# Patient Record
Sex: FEMALE | Race: WHITE
[De-identification: names, ages, dates, MRNs, and addresses within clinical notes are randomized per-mention and may not be internally consistent; named-entity substitution may affect disease eponyms.]

---

## 2017-06-09 NOTE — UC
Complaint Female HPI





- HPI Summary


HPI Summary: 





27 y/o female  presents to the urgent care c/o of vaginal discharge with an 

odor and a burning sensation when she urinates since this morning.  Pt is 

concerned of being pregnant.  Pt states frequency on urination and denies fever

, N/V/D, headache or SOB, pelvic pain.  LMP 2017 with regular menstrual 

cycles.  She has Hx of abnormal PAP and is screened every 6 months. Next appt 

with GYN is in 1 month.





- History Of Current Complaint


Chief Complaint: UCGU


Stated Complaint: Vaginal COMPLAINT


Time Seen by Provider: 17 09:27


Hx Obtained From: Patient


Hx Last Menstrual Period: 17 (but short)


Pregnant?: No


Onset/Duration: Sudden Onset, Lasting Hours, Still Present


Timing: Constant


Severity Initially: Mild


Severity Currently: Mild


Pain Intensity: 0


Pain Scale Used: 0-10 Numeric


Character: Burning


Aggravating Factor(s): Urination


Associated Signs And Symptoms: Positive: Vaginal Discharge, Genital Swelling.  

Negative: Fever, Back Pain, Nausea, Vomiting(# Of Episodes =), Genital Blisters





- Risk Factors


Ectopic Pregnancy Risk Factor: Negative


Ovarian Torsion Risk Factor: Negative





- Allergies/Home Medications


Allergies/Adverse Reactions: 


 Allergies











Allergy/AdvReac Type Severity Reaction Status Date / Time


 


Risperidone [From Risperdal] Allergy Severe FACIAL PAIN Verified 17 08:09











Home Medications: 


 Home Medications





Citalopram TAB* [Celexa TAB*] 10 mg PO DAILY 17 [History Confirmed ]


QUEtiapine TAB* [Seroquel TAB*] 25 mg PO BEDTIME 17 [History Confirmed ]











PMH/Surg Hx/FS Hx/Imm Hx


Previously Healthy: Yes


Psychological History: Bipolar Disorder





- Surgical History


Surgical History: None





- Family History


Known Family History: Positive: Diabetes





- Social History


Occupation: Employed Full-time


Lives: With Family


Alcohol Use: None


Substance Use Type: None


Smoking Status (MU): Never Smoked Tobacco


Household Exposure Type: Cigarettes





- Immunization History


Most Recent Influenza Vaccination: no





Review of Systems


Constitutional: Negative


Skin: Negative


Eyes: Negative


ENT: Negative


Respiratory: Negative


Cardiovascular: Negative


Gastrointestinal: Negative


Genitourinary: Frequency - on urination with burning sensation


Neurovascular: Negative


Musculoskeletal: Negative


Neurological: Negative


Psychological: Negative


All Other Systems Reviewed And Are Negative: Yes





Physical Exam


Triage Information Reviewed: Yes


Vital Signs: 


 Initial Vital Signs











Temp  98.9 F   17 08:05


 


Pulse  84   17 08:05


 


Resp  16   17 08:05


 


BP  110/62   17 08:05


 


Pulse Ox  98   17 08:05














- Additional Comments


PHYSICAL EXAMINATION:


Vital signs reviewed


General: Normotensive, in no acute distress. 


Head: Normocephalic, no lesions. 


Eyes: PERRLA, EOM's full, conjunctivae clear, 


Ears: EAC's clear, TM's normal. Nose: Mucosa normal, no obstruction. 


Throat: Clear, no exudates, no lesions. 


Neck: Supple, no masses, no thyromegaly, no bruits. 


Chest: Lungs clear, no rales, no rhonchi, no wheezes. 


Heart: RR, no murmurs, no rubs, no gallops. 


Abdomen: Soft, no tenderness, no masses, BS normal. 


: Normal, no lesions, no discharge, no hernias noted. 


Pelvic: I was assisted by the Nurse Annimiek.


External genitalia has mild erythema in the labia majora. There is no lesions 

there is no masses noted. Speculum exam: The vaginal walls are within normal 

limits w/ white vaginal discharge with a fishy odor, no  lesions or rashes 

noted. The cervix is closed with erythema around the os. There is no CMT's, and 

no adnexal masses. Sample sent to lab for  G/C and affirm w/ trichomonas.


Rectal: No lesions, no hemorrhoids, 


Back: Normal curvature, no tenderness. 


Extremities: FROM, no deformities, no edema, no erythema. 


Neuro: Physiological, no localizing findings. 


Skin: Normal, no rashes, no lesions noted. 














 Complaint Female Dx





- Course


Course Of Treatment: Vaginal discharge with burning urination: Hx obtained. UA 

and UA pregnancy test ordered: Results UA: negative, UA pregnancy: negative. 

Abnormal Pelvic PE findings:Pelvic: I was assisted by the Nurse Annimiek.  

External genitalia has mild erythema in the labia majora. There is no lesions 

there is no masses noted. Speculum exam: The vaginal walls are within normal 

limits w/ white vaginal discharge with a fishy odor, no  lesions or rashes 

noted. The cervix is closed with erythema around the os. There is no CMT's, and 

no adnexal masses. Sample sent to lab for  G/C and affirm w/ trichomonas. Pt Rx 

Metronidazole 0.75% vaginal appli x 5 day. And  advised if anything else appear 

abnormal in specimen sent to lab will receive a call back from us.  Pt educated 

in BV  and strongly advised to f/u with GYN for further treatment on abnomal 

PAP.  Pt understood and agreed.





- Differential Dx/Diagnosis


Differential Diagnosis/HQI/PQRI: Cervicitis, Pelvic Inflammatory Disease, 

Pregnancy, Sexually Transmitted Disease, Urinary Tract Infection


Provider Diagnoses: Bacterial Vaginosis,





Discharge





- Discharge Plan


Condition: Stable


Disposition: HOME


Prescriptions: 


metroNIDAZOLE VAGINAL 0.75%* 1 applic VAGINAL BEDTIME #1 tube


Patient Education Materials:  Bacterial Vaginosis (ED)


Forms:  *Work Release


Referrals: 


ROMAIN Carr [Primary Care Provider] - 


Additional Instructions: 


Please apply vaginal antibiotic as instructed.  If any other  abnormal results 

are obtained from the specimens collected, you will receive a call to return to 

the urgent care for further treatment. Please keep your appt with the GYN to f/

u for your abnormal PAP.

## 2017-06-11 NOTE — UC
Progress





- Progress Note


Progress Note: 





Pt had burning on urination.  Was started on cephalexin. Urine cult is neg.  

Should stop cephalexin.  If still with sxs, needs definite follow up with PCP 

or urgent care.

## 2017-10-02 NOTE — UC
Complaint Female HPI





- HPI Summary


HPI Summary: 





27 YEAR OLD FEMALE PRESENTS WITH COMPLAINS BILATERAL LEG PAIN AND CONCERNS OF 

BEING PREGNANT. 





- History Of Current Complaint


Stated Complaint: PERSONAL/RIGHT LEG PAIN


Time Seen by Provider: 10/02/17 13:37


Hx Obtained From: Patient


Hx Last Menstrual Period: 5/14/17 (but short)


Onset/Duration: Sudden Onset


Timing: Constant


Severity Initially: Moderate


Severity Currently: Moderate


Pain Scale Used: 0-10 Numeric - 5


Character: Sharp


Aggravating Factor(s): Movement


Alleviating Factor(s): Position





- Allergies/Home Medications


Allergies/Adverse Reactions: 


 Allergies











Allergy/AdvReac Type Severity Reaction Status Date / Time


 


Risperidone [From Risperdal] Allergy Severe FACIAL PAIN Verified 10/02/17 14:06














PMH/Surg Hx/FS Hx/Imm Hx


Previously Healthy: Yes





- Surgical History


Surgical History: None





- Family History


Known Family History: Positive: Unknown, Diabetes





- Social History


Alcohol Use: None


Substance Use Type: None


Smoking Status (MU): Never Smoked Tobacco


Household Exposure Type: Cigarettes





- Immunization History


Most Recent Influenza Vaccination: no





Review of Systems


Constitutional: Negative


Skin: Negative


Eyes: Negative


ENT: Negative


Respiratory: Negative


Cardiovascular: Negative


Gastrointestinal: Negative


Genitourinary: Negative


Motor: Negative


Neurovascular: Negative


Musculoskeletal: Myalgia, Other: - BILATERAL LEG CRAMPS


Neurological: Negative


Psychological: Negative


All Other Systems Reviewed And Are Negative: Yes





Physical Exam


Triage Information Reviewed: Yes


Eye Exam: Normal


ENT Exam: Normal


Dental Exam: Normal


Neck exam: Normal


Neck: Positive: 1


Respiratory Exam: Normal


Cardiovascular Exam: Normal


Abdominal Exam: Normal


Musculoskeletal Exam: Normal


Neurological Exam: Normal


Psychological Exam: Normal


Skin Exam: Normal





 Complaint Female Dx





- Differential Dx/Diagnosis


Provider Diagnoses: BILATERAL LEG CRAMPS





Discharge





- Discharge Plan


Condition: Stable


Disposition: HOME


Prescriptions: 


Nitrofurantoin Monohyd Macro [Macrobid] 100 mg PO BID #14 cap


Patient Education Materials:  Urinary Tract Infection in Women (ED), Leg Pain (

ED)


Forms:  *Work Release


Referrals: 


ROMAIN Carr [Primary Care Provider] -

## 2017-12-19 NOTE — UC
Abdominal Pain Female HPI





- HPI Summary


HPI Summary: 





27 year old female presents with severe rlq pain. 





- History of Current Complaint


Stated Complaint: RLQ PAIN


Time Seen by Provider: 12/19/17 20:07


Hx Obtained From: Patient


Hx Last Menstrual Period: 5/14/17 (but short)


Onset/Duration: Sudden Onset


Severity Initially: Moderate


Severity Currently: Moderate


Pain Scale Used: 0-10 Numeric - 8


Location: Discrete At: RLQ


Radiates: No


Allergies/Adverse Reactions: 


 Allergies











Allergy/AdvReac Type Severity Reaction Status Date / Time


 


Risperidone [From Risperdal] Allergy Severe FACIAL PAIN Verified 10/02/17 14:06














PMH/Surg Hx/FS Hx/Imm Hx


Previously Healthy: Yes





- Surgical History


Surgical History: None





- Family History


Known Family History: Positive: Unknown, Diabetes





- Social History


Alcohol Use: None


Substance Use Type: None


Smoking Status (MU): Never Smoked Tobacco


Household Exposure Type: Cigarettes





- Immunization History


Most Recent Influenza Vaccination: no





Review of Systems


Constitutional: Negative


Skin: Negative


Eyes: Negative


ENT: Negative


Respiratory: Negative


Cardiovascular: Negative


Gastrointestinal: Abdominal Pain - rlq


Genitourinary: Negative


Motor: Negative


Neurovascular: Negative


Musculoskeletal: Negative


Neurological: Negative


Psychological: Negative


All Other Systems Reviewed And Are Negative: Yes





Physical Exam


Triage Information Reviewed: Yes


Vital Signs Reviewed: Yes


Eye Exam: Normal


ENT Exam: Normal


Dental Exam: Normal


Neck exam: Normal


Neck: Positive: 1


Respiratory Exam: Normal


Cardiovascular Exam: Normal


Abdominal Exam: Normal


Abdomen Description: Positive: Other: - rlq pain


Musculoskeletal Exam: Normal


Neurological Exam: Normal


Psychological Exam: Normal


Skin Exam: Normal





Abd Pain Female Course/Dx





- Differential Dx/Diagnosis


Provider Diagnoses: rlq pain





Discharge





- Discharge Plan


Condition: Stable


Disposition: OTHER


Discharge Disposition Comment: patient suggested to go to the er


Patient Education Materials:  Acute Abdominal Pain (ED)


Referrals: 


ROMAIN Carr [[employer], [employer], OTHER] - 


Additional Instructions: 


patient referred to go to the er

## 2018-10-04 ENCOUNTER — HOSPITAL ENCOUNTER (EMERGENCY)
Dept: HOSPITAL 25 - UCCORT | Age: 28
Discharge: HOME | End: 2018-10-04
Payer: COMMERCIAL

## 2018-10-04 VITALS — SYSTOLIC BLOOD PRESSURE: 110 MMHG | DIASTOLIC BLOOD PRESSURE: 60 MMHG

## 2018-10-04 DIAGNOSIS — Z3A.15: ICD-10-CM

## 2018-10-04 DIAGNOSIS — O99.512: ICD-10-CM

## 2018-10-04 DIAGNOSIS — Z88.8: ICD-10-CM

## 2018-10-04 DIAGNOSIS — O99.332: Primary | ICD-10-CM

## 2018-10-04 PROCEDURE — 99211 OFF/OP EST MAY X REQ PHY/QHP: CPT

## 2018-10-04 PROCEDURE — G0463 HOSPITAL OUTPT CLINIC VISIT: HCPCS

## 2018-10-04 NOTE — UC
Throat Pain/Nasal Chepe HPI





- HPI Summary


HPI Summary: 





15 weeks gestational age. One day history of congestion malaise and ear 

pressure without fever, shortness of breath, cough. 





- History of Current Complaint


Chief Complaint: UCGeneralIllness


Stated Complaint: SORE THROAT,COUGH,UPPER RESPIRATORY


Time Seen by Provider: 10/04/18 21:29


Hx Obtained From: Patient


Hx Last Menstrual Period: 5/14/17 (but short)


Pregnant?: Yes - hep C positive


Onset/Duration: Gradual Onset, Lasting Days - 1


Pain Intensity: 5


Associated Signs & Symptoms: Positive: Dysphagia, Hoarseness, Nasal Discharge





- Epiglottits Risk Factors


Epiglottis Risk Factors: Negative





- Allergies/Home Medications


Allergies/Adverse Reactions: 


 Allergies











Allergy/AdvReac Type Severity Reaction Status Date / Time


 


benztropine [From Cogentin] Allergy  Agitation Verified 10/04/18 20:35


 


risperidone Allergy  Facial Pain Verified 10/04/18 20:33











Home Medications: 


 Home Medications





Lurasidone(*) [Latuda] 40 mg PO DAILY 10/04/18 [History Confirmed 10/04/18]


hydrOXYzine HCL TAB* [Atarax 10 MG TAB*] 10 mg PO SEE INSTRUCTIONS 10/04/18 [

History Confirmed 10/04/18]


hydrOXYzine HCl [Hydroxyzine HCl] 50 mg PO BEDTIME 10/04/18 [History Confirmed 

10/04/18]











PMH/Surg Hx/FS Hx/Imm Hx


Previously Healthy: No - hepatitis C


Psychological History: Anxiety





- Surgical History


Surgical History: None





- Family History


Known Family History: Positive: Diabetes





- Social History


Occupation: Employed Full-time


Lives: With Family


Alcohol Use: None


Substance Use Type: None


Smoking Status (MU): Current Every Day Smoker


Type: Cigarettes


Amount Used/How Often: 1/2 ppd


Length of Time of Smoking/Using Tobacco: 9 mos.


Household Exposure Type: Cigarettes





- Immunization History


Most Recent Influenza Vaccination: no





Review of Systems


Constitutional: Fatigue


Skin: Negative


Eyes: Negative


ENT: Sore Throat, Ear Ache, Nasal Discharge


Respiratory: Negative


Cardiovascular: Negative


Gastrointestinal: Negative


Genitourinary: Negative


Motor: Negative


Neurovascular: Negative


Musculoskeletal: Negative


Neurological: Negative


Psychological: Anxious


Is Patient Immunocompromised?: No


All Other Systems Reviewed And Are Negative: Yes





Physical Exam


Triage Information Reviewed: Yes


Appearance: Ill-Appearing - congested and looks unwell


Vital Signs: 


 Initial Vital Signs











Temp  98.3 F   10/04/18 20:40


 


Pulse  85   10/04/18 20:40


 


Resp  22   10/04/18 20:40


 


BP  110/60   10/04/18 20:40


 


Pulse Ox  98   10/04/18 20:40











Eyes: Positive: Conjunctiva Clear


ENT: Positive: Pharyngeal erythema.  Negative: Tonsillar swelling, Tonsillar 

exudate


Neck: Positive: Supple, Nontender, No Lymphadenopathy


Respiratory: Positive: Lungs clear, Normal breath sounds


Cardiovascular: Positive: RRR, No Murmur


Psychological Exam: Other - mildly depressed mood and affect.


Skin Exam: Normal





Throat Pain/Nasal Course/Dx





- Course


Course Of Treatment: symptomatic treatment.





- Differential Dx/Diagnosis


Provider Diagnoses: viral URI





Discharge





- Sign-Out/Discharge


Documenting (check all that apply): Patient Departure


All imaging exams completed and their final reports reviewed: No Studies





- Discharge Plan


Condition: Stable


Disposition: HOME


Patient Education Materials:  Upper Respiratory Infection (ED)


Forms:  *Work Release


Referrals: 


Luzmaria Cross MD [Primary Care Provider] - 


Additional Instructions: 


Continue symptomatic treatment, including use of acetaminophen for sore throat. 

Ensure rest and fluids. 





- Billing Disposition and Condition


Condition: STABLE


Disposition: Home